# Patient Record
Sex: FEMALE | Race: WHITE | Employment: UNEMPLOYED | URBAN - METROPOLITAN AREA
[De-identification: names, ages, dates, MRNs, and addresses within clinical notes are randomized per-mention and may not be internally consistent; named-entity substitution may affect disease eponyms.]

---

## 2017-10-17 ENCOUNTER — OFFICE VISIT (OUTPATIENT)
Dept: ORTHOPEDIC SURGERY | Age: 82
End: 2017-10-17

## 2017-10-17 VITALS
HEART RATE: 96 BPM | WEIGHT: 148 LBS | DIASTOLIC BLOOD PRESSURE: 90 MMHG | SYSTOLIC BLOOD PRESSURE: 170 MMHG | BODY MASS INDEX: 24.66 KG/M2 | HEIGHT: 65 IN

## 2017-10-17 DIAGNOSIS — M25.562 CHRONIC PAIN OF BOTH KNEES: Primary | ICD-10-CM

## 2017-10-17 DIAGNOSIS — M25.561 CHRONIC PAIN OF BOTH KNEES: Primary | ICD-10-CM

## 2017-10-17 DIAGNOSIS — M17.0 PRIMARY OSTEOARTHRITIS OF BOTH KNEES: ICD-10-CM

## 2017-10-17 DIAGNOSIS — G89.29 CHRONIC PAIN OF BOTH KNEES: Primary | ICD-10-CM

## 2017-10-17 PROCEDURE — G8427 DOCREV CUR MEDS BY ELIG CLIN: HCPCS | Performed by: ORTHOPAEDIC SURGERY

## 2017-10-17 PROCEDURE — G8599 NO ASA/ANTIPLAT THER USE RNG: HCPCS | Performed by: ORTHOPAEDIC SURGERY

## 2017-10-17 PROCEDURE — G8420 CALC BMI NORM PARAMETERS: HCPCS | Performed by: ORTHOPAEDIC SURGERY

## 2017-10-17 PROCEDURE — 20610 DRAIN/INJ JOINT/BURSA W/O US: CPT | Performed by: ORTHOPAEDIC SURGERY

## 2017-10-17 PROCEDURE — 1123F ACP DISCUSS/DSCN MKR DOCD: CPT | Performed by: ORTHOPAEDIC SURGERY

## 2017-10-17 PROCEDURE — 4040F PNEUMOC VAC/ADMIN/RCVD: CPT | Performed by: ORTHOPAEDIC SURGERY

## 2017-10-17 PROCEDURE — 1090F PRES/ABSN URINE INCON ASSESS: CPT | Performed by: ORTHOPAEDIC SURGERY

## 2017-10-17 PROCEDURE — 73564 X-RAY EXAM KNEE 4 OR MORE: CPT | Performed by: ORTHOPAEDIC SURGERY

## 2017-10-17 PROCEDURE — 1036F TOBACCO NON-USER: CPT | Performed by: ORTHOPAEDIC SURGERY

## 2017-10-17 PROCEDURE — G8484 FLU IMMUNIZE NO ADMIN: HCPCS | Performed by: ORTHOPAEDIC SURGERY

## 2017-10-17 PROCEDURE — 99203 OFFICE O/P NEW LOW 30 MIN: CPT | Performed by: ORTHOPAEDIC SURGERY

## 2017-10-17 NOTE — PROGRESS NOTES
not necessarily positive. Ligamentous stability is good in all directions. There is crepitus in the patellofemoral joint with moderate peripatellar tenderness. No major effusion noted today. Range of Motion:  Left and right 0-115°    Strength:  Quadriceps strength 3/5 left and 3/5 right    Special Tests:  Negative Homans sign left and right    Skin: There are no rashes, ulcerations or lesions. Gait: Antalgic gait utilizing a cane    Reflex signs of left-sided CVA    Additional Comments:       Additional Examinations:         Right Lower Extremity: Examination of the right lower extremity does not show any tenderness, deformity or injury. Range of motion is unremarkable. There is no gross instability. There are no rashes, ulcerations or lesions. Strength and tone are normal.  Left Lower Extremity: Examination of the left lower extremity does not show any tenderness, deformity or injury. Range of motion is unremarkable. There is no gross instability. There are no rashes, ulcerations or lesions. Strength and tone are normal.    Radiology:     X-rays obtained and reviewed in office:  Views standing AP, PA, lateral and sunrise  Location left and right knee  Impression both left and right knee reveal advanced bone-on-bone lateral compartment primary osteoarthritis with advanced patellofemoral primary osteoarthritis. No acute bony changes noted. Assessment :  End-stage lateral compartment and advanced patellofemoral left and right knee primary osteoarthritis. Impression:  Encounter Diagnoses   Name Primary?  Chronic pain of both knees Yes    Primary osteoarthritis of both knees        Office Procedures:  Orders Placed This Encounter   Procedures    Knee Bilateral Standard Extended VW     [id number]    DE BETAMETHASONE ACET&SOD PHOSP    DE ARTHROCENTESIS ASPIR&/INJ MAJOR JT/BURSA W/O US       Treatment Plan: We talked her about the options of treatment.   She understands she is probably not a good candidate in any medical way for surgical intervention. He has done well in the past with injections of cortisone and at this point we talked her about the risks of cortisone and Marcaine and she understood that and accepted it. Our goal would be to see if this helps calm her knees down and then have her come back if they start to get sore again to consider another series of viscous supplementation. She will call us when that occurs. In the meantime she is encouraged to use heat in the morning and ice after activities. I discussed in detail the risks, benefits and complications of an injection which included but are not limited to infection, skin reactions, hot swollen joint, and anaphylaxis with the patient. The patient verbalized understanding and gave informed consent for the injection. The patient's left and right knee was extended to zero degrees and the skin at the supra-lateral pouch was prepped using sterile alcohol solution. A sterile 22-gauge needle was inserted into the knee and the mixture of, 1.5 mL of 0.25% Marcaine, and 1.5 ml of Betamethasone was injected under sterile technique. The needle was withdrawn and the puncture site sealed with a Band-Aid. The patient tolerated the injection well. The patient was instructed to call the office immediately if there is any pain, redness, warmth, fever, or chills.

## 2017-10-17 NOTE — PROGRESS NOTES
Injection administration details:  Date & Time: 10/17/17 2:19 PM  Site & Comments: bilateral knees administered by Dr Wilmar Alejo    Betamethasone   CC# : 1.5  NDC: 9865-7886-89  Lot number: [id number]  EXP: 01/2019    Marcaine 0.25%  CC# : 1.5  NDC: 8252-2531-27  Lot number: [id number]  EXP: 1/1/2019